# Patient Record
Sex: MALE | URBAN - METROPOLITAN AREA
[De-identification: names, ages, dates, MRNs, and addresses within clinical notes are randomized per-mention and may not be internally consistent; named-entity substitution may affect disease eponyms.]

---
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
## 2025-02-19 ENCOUNTER — HOSPITAL ENCOUNTER (EMERGENCY)
Age: 20
Discharge: HOME | End: 2025-02-19
Payer: COMMERCIAL

## 2025-02-19 ENCOUNTER — APPOINTMENT (OUTPATIENT)
Dept: GENERAL RADIOLOGY | Age: 20
End: 2025-02-19
Payer: COMMERCIAL

## 2025-02-19 VITALS
HEIGHT: 68 IN | SYSTOLIC BLOOD PRESSURE: 130 MMHG | BODY MASS INDEX: 20.46 KG/M2 | DIASTOLIC BLOOD PRESSURE: 88 MMHG | WEIGHT: 135 LBS | HEART RATE: 99 BPM | OXYGEN SATURATION: 100 % | TEMPERATURE: 97.9 F | RESPIRATION RATE: 18 BRPM
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
## 2025-02-19 DIAGNOSIS — S92.511A CLOSED DISPLACED FRACTURE OF PROXIMAL PHALANX OF LESSER TOE OF RIGHT FOOT, INITIAL ENCOUNTER: Primary | ICD-10-CM

## 2025-02-19 PROCEDURE — 28515 TREATMENT OF TOE FRACTURE: CPT

## 2025-02-19 PROCEDURE — 6370000000 HC RX 637 (ALT 250 FOR IP)

## 2025-02-19 PROCEDURE — 99283 EMERGENCY DEPT VISIT LOW MDM: CPT

## 2025-02-19 PROCEDURE — 73660 X-RAY EXAM OF TOE(S): CPT

## 2025-02-19 RX ORDER — HYDROCODONE BITARTRATE AND ACETAMINOPHEN 5; 325 MG/1; MG/1
1 TABLET ORAL EVERY 6 HOURS PRN
Qty: 12 TABLET | Refills: 0 | Status: SHIPPED | OUTPATIENT
Start: 2025-02-19 | End: 2025-02-22

## 2025-02-19 RX ORDER — HYDROCODONE BITARTRATE AND ACETAMINOPHEN 5; 325 MG/1; MG/1
1 TABLET ORAL ONCE
Status: COMPLETED | OUTPATIENT
Start: 2025-02-19 | End: 2025-02-19

## 2025-02-19 RX ADMIN — HYDROCODONE BITARTRATE AND ACETAMINOPHEN 1 TABLET: 5; 325 TABLET ORAL at 18:53

## 2025-02-19 ASSESSMENT — LIFESTYLE VARIABLES
HOW OFTEN DO YOU HAVE A DRINK CONTAINING ALCOHOL: MONTHLY OR LESS
HOW MANY STANDARD DRINKS CONTAINING ALCOHOL DO YOU HAVE ON A TYPICAL DAY: PATIENT DOES NOT DRINK
HOW MANY STANDARD DRINKS CONTAINING ALCOHOL DO YOU HAVE ON A TYPICAL DAY: 1 OR 2
HOW OFTEN DO YOU HAVE A DRINK CONTAINING ALCOHOL: NEVER

## 2025-02-19 ASSESSMENT — PAIN SCALES - GENERAL
PAINLEVEL_OUTOF10: 4
PAINLEVEL_OUTOF10: 7

## 2025-02-19 ASSESSMENT — PAIN - FUNCTIONAL ASSESSMENT: PAIN_FUNCTIONAL_ASSESSMENT: 0-10

## 2025-02-19 ASSESSMENT — PAIN DESCRIPTION - LOCATION: LOCATION: TOE (COMMENT WHICH ONE)

## 2025-02-19 ASSESSMENT — PAIN DESCRIPTION - PAIN TYPE: TYPE: ACUTE PAIN

## 2025-02-19 ASSESSMENT — PAIN DESCRIPTION - ORIENTATION: ORIENTATION: RIGHT

## 2025-02-19 ASSESSMENT — ENCOUNTER SYMPTOMS: COLOR CHANGE: 0

## 2025-02-20 NOTE — ED NOTES
Discharge instructions gone over, patient denies any further questions at this time. Ambulated without difficulty with family

## 2025-02-20 NOTE — DISCHARGE INSTRUCTIONS
Unfortunately you do have a fracture of the pinky toe on the right foot.  Some improvement of alignment after reduction in the emergency department.  I did send pain medication to your pharmacy.  You were given a dose of the emergency department.  Elevate and apply ice to 3 times a day for 10 to 15 minutes at a time.  Call podiatry first thing tomorrow morning for follow-up.    Family medicine residency clinic contact information attached to that you may establish with a primary care doctor

## 2025-02-20 NOTE — ED PROVIDER NOTES
[facility] EMERGENCY DEPARTMENT  EMERGENCY DEPARTMENT ENCOUNTER        Pt Name: Nnamdi Orozco  MRN: [id number]  Birthdate 2005  Date of evaluation: 2/19/2025  Provider: PHIL Harvey CNP  PCP: No primary care provider on file.  Note Started: 9:19 PM EST 2/19/25      KASSY. I have evaluated this patient.        CHIEF COMPLAINT       Chief Complaint   Patient presents with    Toe Injury     Right little toe injury while doing martial arts        HISTORY OF PRESENT ILLNESS: 1 or more Elements     History From: Patient, EMR review    Chief Complaint: Right little toe injury    Nnamdi Orozco is a 19 y.o. male who presents to the emergency department via EMS for evaluation of a right little toe injury that he sustained while doing martial arts.  States that he is not exactly sure what happened however he felt an acute onset of sharp stabbing pain in the little toe and noted that it appeared to be offset.  4 out of 10 in intensity upon arrival to the emergency department with the patient states that he thinks that his adrenaline is wearing off because he feels like the pain is progressively getting more intense since transport.  EMS did establish peripheral IV access and gave him 4 mg of Zofran for his nausea but did not give any analgesia prior to arrival.  No additional injuries reported.  No prior orthopedic injuries or surgeries to the affected extremity.    Nursing Notes were all reviewed and agreed with or any disagreements were addressed in the HPI.    REVIEW OF SYSTEMS :      Review of Systems   Musculoskeletal:  Positive for arthralgias (Right little toe).   Skin:  Negative for color change and wound.   Neurological:  Negative for numbness.   Hematological:  Does not bruise/bleed easily.   All other systems reviewed and are negative.      Positives and Pertinent negatives as per HPI.     SURGICAL HISTORY     Past Surgical History:   Procedure Laterality Date    WRIST SURGERY    with the above-mentioned concerns as detailed under HPI.  Examination as noted above.  Concerning for either dislocation or fracture of the little toe on the right foot.  Patient was ordered for Cook.  X-ray obtained.  Notable for proximal phalanx fracture of the little toe.  Verbal consent obtained for digital block to help facilitate reduction.  Risk and benefits reviewed.  Patient provides verbal consent.  Anesthesia achieved using 3 mL infiltration of 1% lidocaine without epinephrine.  Anesthesia achieved.  Fracture was reduced by myself.  After reduction Urban tape was placed and repeat x-ray was obtained.  Shows improvement of the alignment.  Patient was fitted with a postop shoe and referred to podiatry for evaluation.  Ordered for prescription for Norco at discharge which was sent to the patient's preferred pharmacy.  Additional RICE therapies reviewed.  Return precautions provided.  Provided with the family medicine residency clinic to establish with primary care.  The patient is agreeable with plan    Disposition Considerations (tests considered but not done, Admit vs D/C, Shared Decision Making, Pt Expectation of Test or Tx.): Appropriate for discharge with podiatry follow-up, PCP referral, RICE therapy for management of fracture of the fifth proximal phalanx on the right foot    The patient tolerated their visit well.  The patient and / or the family were informed of the results of any tests, a time was given to answer questions, a plan was proposed and they agreed with plan.    I am the Primary Clinician of Record.  FINAL IMPRESSION      1. Closed displaced fracture of proximal phalanx of lesser toe of right foot, initial encounter    2. Injury while engaged in martial arts          DISPOSITION/PLAN     DISPOSITION Decision To Discharge 02/19/2025 08:23:20 PM   DISPOSITION CONDITION Stable           PATIENT REFERRED TO:  Dwight Monet DPM  [address], [address]  [place] [facility]